# Patient Record
Sex: MALE | Race: BLACK OR AFRICAN AMERICAN | NOT HISPANIC OR LATINO | Employment: PART TIME | URBAN - METROPOLITAN AREA
[De-identification: names, ages, dates, MRNs, and addresses within clinical notes are randomized per-mention and may not be internally consistent; named-entity substitution may affect disease eponyms.]

---

## 2023-02-12 ENCOUNTER — HOSPITAL ENCOUNTER (EMERGENCY)
Facility: HOSPITAL | Age: 43
Discharge: HOME | End: 2023-02-12
Attending: INTERNAL MEDICINE

## 2023-02-12 VITALS
SYSTOLIC BLOOD PRESSURE: 192 MMHG | DIASTOLIC BLOOD PRESSURE: 134 MMHG | RESPIRATION RATE: 23 BRPM | HEART RATE: 98 BPM | WEIGHT: 190 LBS | OXYGEN SATURATION: 98 % | TEMPERATURE: 99 F

## 2023-02-12 DIAGNOSIS — I10 HYPERTENSION, UNCONTROLLED: Primary | ICD-10-CM

## 2023-02-12 DIAGNOSIS — R06.02 SHORTNESS OF BREATH: ICD-10-CM

## 2023-02-12 DIAGNOSIS — R07.9 CHEST PAIN: ICD-10-CM

## 2023-02-12 DIAGNOSIS — J18.9 COMMUNITY ACQUIRED PNEUMONIA, UNSPECIFIED LATERALITY: ICD-10-CM

## 2023-02-12 LAB
ALBUMIN SERPL-MCNC: 3.3 G/DL (ref 3.5–5)
ALBUMIN/GLOB SERPL: 0.6 RATIO (ref 1.1–2)
ALP SERPL-CCNC: 89 UNIT/L (ref 40–150)
ALT SERPL-CCNC: 42 UNIT/L (ref 0–55)
AST SERPL-CCNC: 26 UNIT/L (ref 5–34)
BASOPHILS # BLD AUTO: 0.05 X10(3)/MCL (ref 0–0.2)
BASOPHILS NFR BLD AUTO: 0.5 %
BILIRUBIN DIRECT+TOT PNL SERPL-MCNC: 0.3 MG/DL
BNP BLD-MCNC: 13.7 PG/ML
BUN SERPL-MCNC: 14.1 MG/DL (ref 8.9–20.6)
CALCIUM SERPL-MCNC: 9.9 MG/DL (ref 8.4–10.2)
CHLORIDE SERPL-SCNC: 104 MMOL/L (ref 98–107)
CO2 SERPL-SCNC: 27 MMOL/L (ref 22–29)
CREAT SERPL-MCNC: 1.05 MG/DL (ref 0.73–1.18)
D DIMER PPP IA.FEU-MCNC: 0.65 UG/ML FEU (ref 0–0.5)
EOSINOPHIL # BLD AUTO: 0.25 X10(3)/MCL (ref 0–0.9)
EOSINOPHIL NFR BLD AUTO: 2.5 %
ERYTHROCYTE [DISTWIDTH] IN BLOOD BY AUTOMATED COUNT: 12.2 % (ref 11.5–17)
FLUAV AG UPPER RESP QL IA.RAPID: NOT DETECTED
FLUBV AG UPPER RESP QL IA.RAPID: NOT DETECTED
GFR SERPLBLD CREATININE-BSD FMLA CKD-EPI: >60 MLS/MIN/1.73/M2
GLOBULIN SER-MCNC: 5.5 GM/DL (ref 2.4–3.5)
GLUCOSE SERPL-MCNC: 92 MG/DL (ref 74–100)
HCT VFR BLD AUTO: 45.2 % (ref 42–52)
HGB BLD-MCNC: 14.7 GM/DL (ref 14–18)
IMM GRANULOCYTES # BLD AUTO: 0.02 X10(3)/MCL (ref 0–0.04)
IMM GRANULOCYTES NFR BLD AUTO: 0.2 %
LYMPHOCYTES # BLD AUTO: 1.98 X10(3)/MCL (ref 0.6–4.6)
LYMPHOCYTES NFR BLD AUTO: 19.5 %
MCH RBC QN AUTO: 29.3 PG
MCHC RBC AUTO-ENTMCNC: 32.5 MG/DL (ref 33–36)
MCV RBC AUTO: 90 FL (ref 80–94)
MONOCYTES # BLD AUTO: 1.03 X10(3)/MCL (ref 0.1–1.3)
MONOCYTES NFR BLD AUTO: 10.2 %
NEUTROPHILS # BLD AUTO: 6.8 X10(3)/MCL (ref 2.1–9.2)
NEUTROPHILS NFR BLD AUTO: 67.1 %
NRBC BLD AUTO-RTO: 0 %
PLATELET # BLD AUTO: 474 X10(3)/MCL (ref 130–400)
PMV BLD AUTO: 10.1 FL (ref 7.4–10.4)
POTASSIUM SERPL-SCNC: 3.2 MMOL/L (ref 3.5–5.1)
PROT SERPL-MCNC: 8.8 GM/DL (ref 6.4–8.3)
RBC # BLD AUTO: 5.02 X10(6)/MCL (ref 4.7–6.1)
SARS-COV-2 RNA RESP QL NAA+PROBE: NOT DETECTED
SODIUM SERPL-SCNC: 141 MMOL/L (ref 136–145)
TROPONIN I SERPL-MCNC: <0.01 NG/ML (ref 0–0.04)
WBC # SPEC AUTO: 10.1 X10(3)/MCL (ref 4.5–11.5)

## 2023-02-12 PROCEDURE — 85025 COMPLETE CBC W/AUTO DIFF WBC: CPT | Performed by: INTERNAL MEDICINE

## 2023-02-12 PROCEDURE — 63600175 PHARM REV CODE 636 W HCPCS: Performed by: INTERNAL MEDICINE

## 2023-02-12 PROCEDURE — 99285 EMERGENCY DEPT VISIT HI MDM: CPT | Mod: 25

## 2023-02-12 PROCEDURE — 85379 FIBRIN DEGRADATION QUANT: CPT | Performed by: INTERNAL MEDICINE

## 2023-02-12 PROCEDURE — 96374 THER/PROPH/DIAG INJ IV PUSH: CPT

## 2023-02-12 PROCEDURE — 0240U COVID/FLU A&B PCR: CPT | Performed by: INTERNAL MEDICINE

## 2023-02-12 PROCEDURE — 25000003 PHARM REV CODE 250: Performed by: INTERNAL MEDICINE

## 2023-02-12 PROCEDURE — 84484 ASSAY OF TROPONIN QUANT: CPT | Performed by: INTERNAL MEDICINE

## 2023-02-12 PROCEDURE — 93005 ELECTROCARDIOGRAM TRACING: CPT

## 2023-02-12 PROCEDURE — 83880 ASSAY OF NATRIURETIC PEPTIDE: CPT | Performed by: INTERNAL MEDICINE

## 2023-02-12 PROCEDURE — 96375 TX/PRO/DX INJ NEW DRUG ADDON: CPT

## 2023-02-12 PROCEDURE — 80053 COMPREHEN METABOLIC PANEL: CPT | Performed by: INTERNAL MEDICINE

## 2023-02-12 RX ORDER — LISINOPRIL AND HYDROCHLOROTHIAZIDE 12.5; 2 MG/1; MG/1
1 TABLET ORAL DAILY
Qty: 30 TABLET | Refills: 3 | Status: SHIPPED | OUTPATIENT
Start: 2023-02-12 | End: 2023-02-18

## 2023-02-12 RX ORDER — FUROSEMIDE 10 MG/ML
20 INJECTION INTRAMUSCULAR; INTRAVENOUS
Status: COMPLETED | OUTPATIENT
Start: 2023-02-12 | End: 2023-02-12

## 2023-02-12 RX ORDER — ENALAPRILAT 1.25 MG/ML
0.62 INJECTION INTRAVENOUS
Status: COMPLETED | OUTPATIENT
Start: 2023-02-12 | End: 2023-02-12

## 2023-02-12 RX ORDER — PROMETHAZINE HYDROCHLORIDE AND DEXTROMETHORPHAN HYDROBROMIDE 6.25; 15 MG/5ML; MG/5ML
5 SYRUP ORAL EVERY 4 HOURS PRN
Qty: 180 ML | Refills: 0 | Status: SHIPPED | OUTPATIENT
Start: 2023-02-12 | End: 2023-03-01 | Stop reason: ALTCHOICE

## 2023-02-12 RX ORDER — AZITHROMYCIN 250 MG/1
TABLET, FILM COATED ORAL
Qty: 6 TABLET | Refills: 0 | Status: SHIPPED | OUTPATIENT
Start: 2023-02-12 | End: 2023-03-01 | Stop reason: ALTCHOICE

## 2023-02-12 RX ADMIN — FUROSEMIDE 20 MG: 10 INJECTION INTRAMUSCULAR; INTRAVENOUS at 03:02

## 2023-02-12 RX ADMIN — ENALAPRILAT 0.62 MG: 1.25 INJECTION INTRAVENOUS at 05:02

## 2023-02-12 RX ADMIN — NITROGLYCERIN 1 INCH: 20 OINTMENT TOPICAL at 03:02

## 2023-02-12 NOTE — ED PROVIDER NOTES
Encounter Date: 2/12/2023       History     Chief Complaint   Patient presents with    Cough     Cold cough congestion x2 days, pt is hypertensive, has not been taking BP meds.      Presents with cough for few days, no fever or sick contacts. States was Dx with HTN but not taking medications. Cough is wet with clear sputum. Denies chest pain or leg swelling    The history is provided by the patient.   Review of patient's allergies indicates:  No Known Allergies  No past medical history on file.  No past surgical history on file.  No family history on file.     Review of Systems   Constitutional:  Negative for fever.   HENT:  Negative for sore throat.    Respiratory:  Positive for cough and shortness of breath.    Cardiovascular:  Negative for chest pain.   Gastrointestinal:  Negative for nausea.   Genitourinary:  Negative for dysuria.   Musculoskeletal:  Negative for back pain.   Skin:  Negative for rash.   Neurological:  Negative for weakness.   Hematological:  Does not bruise/bleed easily.   All other systems reviewed and are negative.    Physical Exam     Initial Vitals [02/12/23 1441]   BP Pulse Resp Temp SpO2   (!) 195/145 86 18 98.8 °F (37.1 °C) 100 %      MAP       --         Physical Exam    Nursing note and vitals reviewed.  Constitutional: He appears well-developed and well-nourished. No distress.   HENT:   Head: Normocephalic and atraumatic.   Mouth/Throat: Oropharynx is clear and moist.   Eyes: Conjunctivae are normal. Pupils are equal, round, and reactive to light.   Neck: Neck supple. No tracheal deviation present. No JVD present.   Normal range of motion.  Cardiovascular:  Regular rhythm, normal heart sounds and intact distal pulses.           Pulmonary/Chest: No respiratory distress. He has rales (Bibasilar).   Abdominal: Abdomen is soft. Bowel sounds are normal. He exhibits no distension. There is no abdominal tenderness. There is no rebound and no guarding.   Musculoskeletal:         General: No  edema. Normal range of motion.      Cervical back: Normal range of motion and neck supple.     Neurological: He is alert and oriented to person, place, and time. He has normal strength. GCS score is 15. GCS eye subscore is 4. GCS verbal subscore is 5. GCS motor subscore is 6.   Skin: Skin is warm and dry. No rash noted.   Psychiatric: His behavior is normal.       ED Course   Procedures  Labs Reviewed   COMPREHENSIVE METABOLIC PANEL - Abnormal; Notable for the following components:       Result Value    Potassium Level 3.2 (*)     Protein Total 8.8 (*)     Albumin Level 3.3 (*)     Globulin 5.5 (*)     Albumin/Globulin Ratio 0.6 (*)     All other components within normal limits   CBC WITH DIFFERENTIAL - Abnormal; Notable for the following components:    MCHC 32.5 (*)     Platelet 474 (*)     All other components within normal limits   D DIMER, QUANTITATIVE - Abnormal; Notable for the following components:    D-Dimer 0.65 (*)     All other components within normal limits   B-TYPE NATRIURETIC PEPTIDE - Normal   TROPONIN I - Normal   COVID/FLU A&B PCR - Normal    Narrative:     The Xpert Xpress SARS-CoV-2/FLU/RSV plus is a rapid, multiplexed real-time PCR test intended for the simultaneous qualitative detection and differentiation of SARS-CoV-2, Influenza A, Influenza B, and respiratory syncytial virus (RSV) viral RNA in either nasopharyngeal swab or nasal swab specimens.         CBC W/ AUTO DIFFERENTIAL    Narrative:     The following orders were created for panel order CBC auto differential.  Procedure                               Abnormality         Status                     ---------                               -----------         ------                     CBC with Differential[[id number]]        Abnormal            Final result                 Please view results for these tests on the individual orders.   EXTRA TUBES    Narrative:     The following orders were created for panel order EXTRA TUBES.  Procedure                                Abnormality         Status                     ---------                               -----------         ------                     Gold Top Hold[[id number]]                                    In process                   Please view results for these tests on the individual orders.   GOLD TOP HOLD     EKG Readings: (Independently Interpreted)   Initial Reading: No STEMI. Rhythm: Normal Sinus Rhythm. Heart Rate: 90. Ectopy: No Ectopy. Conduction: Normal. ST Segments: Normal ST Segments. T Waves Flipped: III and AVF. Clinical Impression: Normal Sinus Rhythm Other Impression: LVH   ECG Results              EKG 12-lead (In process)  Result time 02/12/23 15:59:05      In process by Interface, Lab In [facility] (02/12/23 15:59:05)                   Narrative:    Test Reason : R07.9,    Vent. Rate : 089 BPM     Atrial Rate : 089 BPM     P-R Int : 142 ms          QRS Dur : 088 ms      QT Int : 380 ms       P-R-T Axes : 051 023 -22 degrees     QTc Int : 462 ms    Normal sinus rhythm  Voltage criteria for left ventricular hypertrophy  T wave abnormality, consider inferior ischemia  Prolonged QT  Abnormal ECG  No previous ECGs available    Referred By: [email or web address]   SELF           Confirmed By:                                   Imaging Results              X-Ray Chest PA And Lateral (Final result)  Result time 02/12/23 15:21:31      Final result by Holly Herron MD (02/12/23 15:21:31)                   Impression:      Multifocal interstitial opacities within the lungs suspicious for atypical infection.  Recommend follow up PA and lateral radiographs in 4-6 weeks after completion of appropriate therapy to ensure resolution and exclude persistent opacity, nodule or mass.      Electronically signed by: Holly Herron  Date:    02/12/2023  Time:    15:21               Narrative:    EXAMINATION:  XR CHEST PA AND LATERAL    CLINICAL HISTORY:  Shortness of breath    TECHNIQUE:  Two views of the  chest    COMPARISON:  04/11/2016    FINDINGS:  LINES AND TUBES: EKG/telemetry leads overlie the chest.    MEDIASTINUM AND DEANA: The cardiac silhouette is normal.    LUNGS: There are patchy interstitial opacities within the lungs at both lung apices and the left lung base.    PLEURA:No pleural effusion. No pneumothorax.    BONES: No acute osseous abnormality.                                       Medications   nitroGLYCERIN 2% TD oint ointment 1 inch (1 inch Topical (Top) Given 2/12/23 1502)   furosemide injection 20 mg (20 mg Intravenous Given 2/12/23 1502)   enalaprilat injection 0.625 mg (0.625 mg Intravenous Given 2/12/23 1710)     Medical Decision Making:   Differential Diagnosis:   Pneumonia, Asthma exacerbation, COPD exacerbation, Pericardial Effusion, Hear Failure, Pulmonary Emboli, Pleural effusion, malignancy, among others   Independently Interpreted Test(s):   I have ordered and independently interpreted X-rays - see prior notes.  I have ordered and independently interpreted EKG Reading(s) - see prior notes  Clinical Tests:   Lab Tests: Ordered  Radiological Study: Ordered  Medical Tests: Ordered                        Clinical Impression:   Final diagnoses:  [R07.9] Chest pain  [R06.02] Shortness of breath  [I10] Hypertension, uncontrolled (Primary)  [J18.9] Community acquired pneumonia, unspecified laterality        ED Disposition Condition    Discharge Stable          ED Prescriptions       Medication Sig Dispense Start Date End Date Auth. Provider    lisinopriL-hydrochlorothiazide (PRINZIDE,ZESTORETIC) 20-12.5 mg per tablet Take 1 tablet by mouth once daily. 30 tablet 2/12/2023 2/12/2024 Wai Murrell MD    azithromycin (Z-JAMES) 250 MG tablet Take 2 tablets by mouth on day 1; Take 1 tablet by mouth on days 2-5 6 tablet 2/12/2023 2/17/2023 Wai Murrell MD    promethazine-dextromethorphan (PROMETHAZINE-DM) 6.25-15 mg/5 mL Syrp Take 5 mLs by mouth every 4 (four) hours as  needed. 180 mL 2/12/2023 2/22/2023 Wai Murrell MD          Follow-up Information       Follow up With Specialties Details Why Contact Info    [facility] Emergency Medicine  If symptoms worsen [facility] [facility] [address] [phone]    [facility]  Schedule an appointment as soon as possible for a visit in 2 months  [address] [facility] [address]             Wai Murrell MD  02/12/23 0057

## 2023-02-18 ENCOUNTER — HOSPITAL ENCOUNTER (EMERGENCY)
Facility: HOSPITAL | Age: 43
Discharge: HOME | End: 2023-02-18
Attending: EMERGENCY MEDICINE

## 2023-02-18 VITALS
OXYGEN SATURATION: 99 % | BODY MASS INDEX: 29.42 KG/M2 | HEIGHT: 68 IN | WEIGHT: 194.13 LBS | RESPIRATION RATE: 20 BRPM | DIASTOLIC BLOOD PRESSURE: 88 MMHG | HEART RATE: 109 BPM | SYSTOLIC BLOOD PRESSURE: 141 MMHG | TEMPERATURE: 98 F

## 2023-02-18 DIAGNOSIS — T78.3XXA ANGIOEDEMA, INITIAL ENCOUNTER: Primary | ICD-10-CM

## 2023-02-18 PROCEDURE — 63600175 PHARM REV CODE 636 W HCPCS: Performed by: EMERGENCY MEDICINE

## 2023-02-18 PROCEDURE — 96375 TX/PRO/DX INJ NEW DRUG ADDON: CPT

## 2023-02-18 PROCEDURE — 99285 EMERGENCY DEPT VISIT HI MDM: CPT | Mod: 25

## 2023-02-18 PROCEDURE — 25000003 PHARM REV CODE 250: Performed by: EMERGENCY MEDICINE

## 2023-02-18 PROCEDURE — 96372 THER/PROPH/DIAG INJ SC/IM: CPT | Performed by: EMERGENCY MEDICINE

## 2023-02-18 PROCEDURE — 96374 THER/PROPH/DIAG INJ IV PUSH: CPT

## 2023-02-18 RX ORDER — FAMOTIDINE 10 MG/ML
20 INJECTION INTRAVENOUS
Status: COMPLETED | OUTPATIENT
Start: 2023-02-18 | End: 2023-02-18

## 2023-02-18 RX ORDER — DIPHENHYDRAMINE HYDROCHLORIDE 50 MG/ML
50 INJECTION INTRAMUSCULAR; INTRAVENOUS
Status: COMPLETED | OUTPATIENT
Start: 2023-02-18 | End: 2023-02-18

## 2023-02-18 RX ORDER — EPINEPHRINE CONVENIENCE KIT 1 MG/ML(1)
0.1 KIT INTRAMUSCULAR; SUBCUTANEOUS
Status: COMPLETED | OUTPATIENT
Start: 2023-02-18 | End: 2023-02-18

## 2023-02-18 RX ORDER — HYDROCHLOROTHIAZIDE 25 MG/1
25 TABLET ORAL DAILY
Qty: 30 TABLET | Refills: 11 | Status: SHIPPED | OUTPATIENT
Start: 2023-02-18 | End: 2023-03-01 | Stop reason: ALTCHOICE

## 2023-02-18 RX ORDER — METHYLPREDNISOLONE SOD SUCC 125 MG
125 VIAL (EA) INJECTION
Status: COMPLETED | OUTPATIENT
Start: 2023-02-18 | End: 2023-02-18

## 2023-02-18 RX ORDER — DIPHENHYDRAMINE HCL 25 MG
25 CAPSULE ORAL EVERY 6 HOURS PRN
Qty: 20 CAPSULE | Refills: 0 | Status: SHIPPED | OUTPATIENT
Start: 2023-02-18 | End: 2023-03-01 | Stop reason: ALTCHOICE

## 2023-02-18 RX ORDER — PREDNISONE 50 MG/1
50 TABLET ORAL DAILY
Qty: 5 TABLET | Refills: 0 | Status: SHIPPED | OUTPATIENT
Start: 2023-02-18 | End: 2023-03-01 | Stop reason: ALTCHOICE

## 2023-02-18 RX ADMIN — FAMOTIDINE 20 MG: 10 INJECTION INTRAVENOUS at 05:02

## 2023-02-18 RX ADMIN — DIPHENHYDRAMINE HYDROCHLORIDE 50 MG: 50 INJECTION, SOLUTION INTRAMUSCULAR; INTRAVENOUS at 05:02

## 2023-02-18 RX ADMIN — EPINEPHRINE 0.1 MG: 1 INJECTION INTRAMUSCULAR; INTRAVENOUS; SUBCUTANEOUS at 05:02

## 2023-02-18 RX ADMIN — METHYLPREDNISOLONE SODIUM SUCCINATE 125 MG: 125 INJECTION, POWDER, FOR SOLUTION INTRAMUSCULAR; INTRAVENOUS at 05:02

## 2023-02-19 NOTE — ED NOTES
Pt iv discontinued. Pt given discharge instructions. Pt instructed to follow up with pcp. Pt instructed to return to er if complications occur or symptoms worsen or is trouble swallowing, trouble breathing, increased swelling, tingling of tongue of lip to return to er immediately. Pt stated understanding.

## 2023-03-01 ENCOUNTER — OFFICE VISIT (OUTPATIENT)
Dept: INTERNAL MEDICINE | Facility: CLINIC | Age: 43
End: 2023-03-01

## 2023-03-01 VITALS
WEIGHT: 188.63 LBS | HEART RATE: 80 BPM | DIASTOLIC BLOOD PRESSURE: 100 MMHG | HEIGHT: 68 IN | BODY MASS INDEX: 28.59 KG/M2 | SYSTOLIC BLOOD PRESSURE: 150 MMHG | RESPIRATION RATE: 18 BRPM | TEMPERATURE: 98 F

## 2023-03-01 DIAGNOSIS — I10 PRIMARY HYPERTENSION: Chronic | ICD-10-CM

## 2023-03-01 DIAGNOSIS — Z13.1 SCREENING FOR DIABETES MELLITUS: Primary | ICD-10-CM

## 2023-03-01 DIAGNOSIS — I10 HYPERTENSION, UNCONTROLLED: ICD-10-CM

## 2023-03-01 DIAGNOSIS — Z91.199 NONCOMPLIANCE: ICD-10-CM

## 2023-03-01 DIAGNOSIS — J18.9 COMMUNITY ACQUIRED PNEUMONIA, UNSPECIFIED LATERALITY: ICD-10-CM

## 2023-03-01 DIAGNOSIS — Z76.89 ENCOUNTER TO ESTABLISH CARE: ICD-10-CM

## 2023-03-01 DIAGNOSIS — E87.6 HYPOKALEMIA: ICD-10-CM

## 2023-03-01 DIAGNOSIS — Z12.5 SCREENING FOR MALIGNANT NEOPLASM OF PROSTATE: ICD-10-CM

## 2023-03-01 PROBLEM — Z85.528 H/O RENAL CELL CARCINOMA: Chronic | Status: ACTIVE | Noted: 2023-03-01

## 2023-03-01 PROBLEM — Z85.528 H/O RENAL CELL CARCINOMA: Status: ACTIVE | Noted: 2023-03-01

## 2023-03-01 PROCEDURE — 99215 OFFICE O/P EST HI 40 MIN: CPT | Mod: PBBFAC | Performed by: NURSE PRACTITIONER

## 2023-03-01 PROCEDURE — 99204 OFFICE O/P NEW MOD 45 MIN: CPT | Mod: S$PBB,,, | Performed by: NURSE PRACTITIONER

## 2023-03-01 PROCEDURE — 99204 PR OFFICE/OUTPT VISIT, NEW, LEVL IV, 45-59 MIN: ICD-10-PCS | Mod: S$PBB,,, | Performed by: NURSE PRACTITIONER

## 2023-03-01 RX ORDER — NIFEDIPINE 60 MG/1
60 TABLET, EXTENDED RELEASE ORAL NIGHTLY
Qty: 30 TABLET | Refills: 1 | Status: SHIPPED | OUTPATIENT
Start: 2023-03-01 | End: 2023-03-21 | Stop reason: SDUPTHER

## 2023-03-01 NOTE — PROGRESS NOTES
Lalitha Garces, NICOLASA   [facility]  [address] [facility] [address]      PATIENT NAME: Alberto Ward  : 1980  DATE: 3/1/23  MRN: [id number]      Reason for Visit / Chief Complaint: Establish Care       History of Present Illness / Problem Focused Workflow     Alberto Ward is a 42 y.o. Black or  male presents to the clinic to establish PCP in [facility]. PMH HTN, St 1 renal cell carcinoma s/p partial right nephrectomy (), polysubstance abuse (amphetamines and marijuana). Pt reported to ED on 23 with c/o cough and congestion; was hypertensive at that time. Pt was dx'd with CAP and discharged with prinizide and Z-Henrique. Pt returned to ED on 23 with swelling to upper lip. Was dx'd with angioedema and prinizide was discontinued and HCTZ was prescribed.    Today, pt reports med compliance. Father present during visit. States swelling to lips resolved with benadryl and prednisone. Reports SOB and cough resolved with completion of zpak. Bp remains elevated today; pt remains asymptomatic. 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 Declines vaccines today d/t costs. Denies chest pain, shortness of breath, cough, fever, headache, dizziness, weakness, abdominal pain, nausea, vomiting, diarrhea, constipation, dysuria, depression, anxiety, SI/HI.      Review of Systems     Review of Systems     See HPI for details    Constitutional: Denies Change in appetite. Denies Chills. Denies Fever. Denies Night sweats.  Eye: Denies Blurred vision. Denies Discharge. Denies Eye pain.  ENT: Denies Decreased hearing. Denies Sore throat. Denies Swollen glands.  Respiratory: Denies Cough. Denies Shortness of breath. Denies Shortness of breath with exertion. Denies Wheezing.  Cardiovascular: DeniesChest pain at rest. Denies Chest pain with exertion. Denies Irregular heartbeat. Denies Palpitations. Denies  Edema.  Gastrointestinal: Denies Abdominal pain. Denies Diarrhea. Denies Nausea. Denies Vomiting. Denies Hematemesis or Hematochezia.  Genitourinary: Denies Dysuria. Denies Urinary frequency. Denies Urinary urgency. Denies Blood in urine.  Endocrine: Denies Cold intolerance. Denies Excessive thirst. Denies Heat intolerance. Denies Weight loss. Denies Weight gain.  Musculoskeletal: Denies Painful joints. Denies Weakness.  Integumentary: Denies Rash. Denies Itching. Denies Dry skin.  Neurologic: Denies Dizziness. Denies Fainting. Denies Headache.  Psychiatric: Denies Depression. Denies Anxiety. Denies Suicidal/Homicidal ideations.    All Other ROS: Negative except as stated in HPI.     Medical / Surgical / Social / Family History       ----------------------------  Hypertension  Renal cell carcinoma, right     Past Surgical History:   Procedure Laterality Date    history of partial right nephrectomy  Right 2013    KIDNEY SURGERY Right        Social History     Socioeconomic History    Marital status: Single   Tobacco Use    Smoking status: Never    Smokeless tobacco: Never   Substance and Sexual Activity    Alcohol use: Yes     Comment: beer on occ    Drug use: Yes     Types: Marijuana     Comment: 2 weeks a month daily     Social Determinants of Health     Transportation Needs: No Transportation Needs    Lack of Transportation (Medical): No    Lack of Transportation (Non-Medical): No   Physical Activity: Inactive    Days of Exercise per Week: 0 days    Minutes of Exercise per Session: 0 min   Housing Stability: Low Risk     Unable to Pay for Housing in the Last Year: No    Number of Places Lived in the Last Year: 1    Unstable Housing in the Last Year: No        History reviewed. No pertinent family history.     Medications and Allergies     Medications  Current Outpatient Medications   Medication Instructions    NIFEdipine (PROCARDIA-XL) 60 mg, Oral, Nightly         Allergies  Review of patient's allergies  "indicates:   Allergen Reactions    Lisinopril (bulk) Other (See Comments)     Angioedema       Physical Examination     BP (!) 150/100 (BP Location: Right arm, Patient Position: Sitting, BP Method: Large (Manual))   Pulse 80   Temp 98.4 °F (36.9 °C) (Oral)   Resp 18   Ht 5' 7.99" (1.727 m)   Wt 85.5 kg (188 lb 9.6 oz)   BMI 28.68 kg/m²     Physical Exam    General: Alert and oriented, No acute distress.  Head: Normocephalic, Atraumatic.  Eye: Pupils are equal, round and reactive to light, Extraocular movements are intact, Sclera non-icteric.  Ears/Nose/Throat: Normal, Mucosa moist,Clear.  Neck/Thyroid: Supple, Non-tender, No carotid bruit, No lymphadenopathy, No JVD, Full range of motion.  Respiratory: Clear to auscultation bilaterally; No wheezes, rales or rhonchi,Non-labored respirations, Symmetrical chest wall expansion.  Cardiovascular: Regular rate and rhythm, S1/S2 normal, No murmurs, rubs or gallops.  Gastrointestinal: Soft, Non-tender, Non-distended, Normal bowel sounds, No palpable organomegaly.  Musculoskeletal: Normal range of motion.  Integumentary: Warm, Dry, Intact, No suspicious lesions or rashes.  Extremities: No clubbing, cyanosis or edema  Neurologic: No focal deficits, Cranial Nerves II-XII are grossly intact, Motor strength normal upper and lower extremities, Sensory exam intact.  Psychiatric: Normal interaction, Coherent speech, Euthymic mood, Appropriate affect       Results     Lab Results   Component Value Date    WBC 10.1 02/12/2023    HGB 14.7 02/12/2023    HCT 45.2 02/12/2023     (H) 02/12/2023    ALT 42 02/12/2023    AST 26 02/12/2023     02/12/2023    K 3.2 (L) 02/12/2023    CREATININE 1.05 02/12/2023    BUN 14.1 02/12/2023    CO2 27 02/12/2023     Narrative & Impression  EXAMINATION:  XR CHEST PA AND LATERAL     CLINICAL HISTORY:  Shortness of breath     TECHNIQUE:  Two views of the chest     COMPARISON:  04/11/2016     FINDINGS:  LINES AND TUBES: EKG/telemetry leads " overlie the chest.     MEDIASTINUM AND DEANA: The cardiac silhouette is normal.     LUNGS: There are patchy interstitial opacities within the lungs at both lung apices and the left lung base.     PLEURA:No pleural effusion. No pneumothorax.     BONES: No acute osseous abnormality.     Impression:     Multifocal interstitial opacities within the lungs suspicious for atypical infection.  Recommend follow up PA and lateral radiographs in 4-6 weeks after completion of appropriate therapy to ensure resolution and exclude persistent opacity, nodule or mass.        Electronically signed by: Holly Herron  Date:                                            02/12/2023  Time:                                           15:21    Assessment and Plan (including Health Maintenance)     Plan:     1. Encounter to establish care  Labs ordered.    2. Hypertension, uncontrolled  Elevated.  D/C HCTZ.  Rx procardia 60 mg daily.  Follow a low sodium (less than 2 grams of sodium per day), DASH diet.   Continue medications as prescribed.  Monitor blood pressure and report any consistent values greater than 140/90 and keep a log.  Maintain healthy weight with a BMI goal of <30.   Aerobic exercise for 150 minutes per week (or 5 days a week for 30 minutes each day).    - Ambulatory referral/consult to Internal Medicine  - Lipid Panel; Future  - Microalbumin/Creatinine Ratio, Urine; Future  - TSH; Future  - Urinalysis, Reflex to Urine Culture; Future    3. Community acquired pneumonia, unspecified laterality  Completed zpak as prescribed.  Repeat CXR ordered.  - Ambulatory referral/consult to Internal Medicine  - X-Ray Chest PA And Lateral; Future    4. Hypokalemia  K 3.2 on 2/13/23.  Denies S/S of hypokalemia.  Repeat CMP ordered.  - Comprehensive Metabolic Panel; Future    5. Noncompliance  At length discussion with pt regarding continued medical/medicine noncompliance, i.e. stroke, MI, DKA, loss of sight, limb, renal failure and possible  death.  Discussed importance of controlling HTN with hx of partial nephrectomy and renal cell carcinoma.  Understanding voiced.      Problem List Items Addressed This Visit          Pulmonary    CAP (community acquired pneumonia)    Relevant Orders    X-Ray Chest PA And Lateral       Cardiac/Vascular    Primary hypertension (Chronic)       Renal/    Hypokalemia    Relevant Orders    Comprehensive Metabolic Panel       Palliative Care    Noncompliance       Other    Encounter to establish care     Other Visit Diagnoses       Screening for diabetes mellitus    -  Primary    Relevant Orders    Hemoglobin A1C    Hypertension, uncontrolled        Relevant Orders    Lipid Panel    Microalbumin/Creatinine Ratio, Urine    TSH    Urinalysis, Reflex to Urine Culture    Screening for malignant neoplasm of prostate        Relevant Orders    PSA, Screening              Health Maintenance Due   Topic Date Due    Hepatitis C Screening  Never done    Lipid Panel  Never done    COVID-19 Vaccine (1) Never done    Pneumococcal Vaccines (Age 0-64) (1 - PCV) Never done    HIV Screening  Never done    TETANUS VACCINE  Never done    Hemoglobin A1c (Diabetic Prevention Screening)  Never done    Influenza Vaccine (1) Never done       Follow up in about 2 weeks (around 3/15/2023) for Wellness and bp check. Labs and xr prior to appt..        Signature:  NICOLASA Johnson  [facility]  [address] [facility] [address]

## 2023-03-03 ENCOUNTER — HOSPITAL ENCOUNTER (OUTPATIENT)
Dept: RADIOLOGY | Facility: HOSPITAL | Age: 43
Discharge: HOME | End: 2023-03-03
Attending: NURSE PRACTITIONER

## 2023-03-03 DIAGNOSIS — J18.9 COMMUNITY ACQUIRED PNEUMONIA, UNSPECIFIED LATERALITY: ICD-10-CM

## 2023-03-03 PROCEDURE — 71046 X-RAY EXAM CHEST 2 VIEWS: CPT | Mod: TC

## 2023-03-21 ENCOUNTER — OFFICE VISIT (OUTPATIENT)
Dept: INTERNAL MEDICINE | Facility: CLINIC | Age: 43
End: 2023-03-21

## 2023-03-21 ENCOUNTER — HOSPITAL ENCOUNTER (OUTPATIENT)
Dept: RADIOLOGY | Facility: HOSPITAL | Age: 43
Discharge: HOME | End: 2023-03-21
Attending: NURSE PRACTITIONER

## 2023-03-21 VITALS
HEIGHT: 68 IN | DIASTOLIC BLOOD PRESSURE: 82 MMHG | BODY MASS INDEX: 28.63 KG/M2 | SYSTOLIC BLOOD PRESSURE: 138 MMHG | RESPIRATION RATE: 18 BRPM | HEART RATE: 83 BPM | WEIGHT: 188.94 LBS | TEMPERATURE: 98 F

## 2023-03-21 DIAGNOSIS — R73.03 PREDIABETES: Chronic | ICD-10-CM

## 2023-03-21 DIAGNOSIS — I10 PRIMARY HYPERTENSION: Chronic | ICD-10-CM

## 2023-03-21 DIAGNOSIS — Z00.00 WELLNESS EXAMINATION: Primary | ICD-10-CM

## 2023-03-21 DIAGNOSIS — M54.2 BILATERAL NECK PAIN: ICD-10-CM

## 2023-03-21 DIAGNOSIS — E66.3 OVERWEIGHT WITH BODY MASS INDEX (BMI) OF 28 TO 28.9 IN ADULT: Chronic | ICD-10-CM

## 2023-03-21 PROCEDURE — 99212 OFFICE O/P EST SF 10 MIN: CPT | Mod: 25,S$PBB,, | Performed by: NURSE PRACTITIONER

## 2023-03-21 PROCEDURE — 99396 PR PREVENTIVE VISIT,EST,40-64: ICD-10-PCS | Mod: S$PBB,,, | Performed by: NURSE PRACTITIONER

## 2023-03-21 PROCEDURE — 99396 PREV VISIT EST AGE 40-64: CPT | Mod: S$PBB,,, | Performed by: NURSE PRACTITIONER

## 2023-03-21 PROCEDURE — 99214 OFFICE O/P EST MOD 30 MIN: CPT | Mod: PBBFAC | Performed by: NURSE PRACTITIONER

## 2023-03-21 PROCEDURE — 72040 X-RAY EXAM NECK SPINE 2-3 VW: CPT | Mod: TC

## 2023-03-21 PROCEDURE — 99212 PR OFFICE/OUTPT VISIT, EST, LEVL II, 10-19 MIN: ICD-10-PCS | Mod: 25,S$PBB,, | Performed by: NURSE PRACTITIONER

## 2023-03-21 RX ORDER — NIFEDIPINE 60 MG/1
60 TABLET, EXTENDED RELEASE ORAL NIGHTLY
Qty: 90 TABLET | Refills: 1 | Status: SHIPPED | OUTPATIENT
Start: 2023-03-21

## 2023-03-21 RX ORDER — TIZANIDINE 4 MG/1
4 TABLET ORAL EVERY 8 HOURS PRN
Qty: 30 TABLET | Refills: 1 | Status: SHIPPED | OUTPATIENT
Start: 2023-03-21

## 2023-03-21 NOTE — PROGRESS NOTES
Lalitha Garces, NICOLASA   [facility]  [address] [facility] [address]      PATIENT NAME: Alberto Ward  : 1980  DATE: 3/21/23  MRN: [id number]      Reason for Visit / Chief Complaint: Hypertension (Wellness, refused vaccines, X-ray and lab results 3/3/23, c/o neck pain off/on )       History of Present Illness / Problem Focused Workflow     Alberto Ward is a 42 y.o. Black or  male presents to the clinic for annual exam. PMH HTN, St 1 renal cell carcinoma s/p partial right nephrectomy (), polysubstance abuse (amphetamines and marijuana).     Today, pt reports med compliance. Bp at goal today. Denies any lingering cough or SOB. Father present again today during visit. Pt reports intermittent neck pain, which denies today. Labs reviewed with pt. Declines vaccines again today. Denies chest pain, shortness of breath, cough, fever, headache, dizziness, weakness, abdominal pain, nausea, vomiting, diarrhea, constipation, dysuria, depression, anxiety, SI/HI.    Review of Systems     Review of Systems     See HPI for details    Constitutional: Denies Change in appetite. Denies Chills. Denies Fever. Denies Night sweats.  Eye: Denies Blurred vision. Denies Discharge. Denies Eye pain.  ENT: Denies Decreased hearing. Denies Sore throat. Denies Swollen glands.  Respiratory: Denies Cough. Denies Shortness of breath. Denies Shortness of breath with exertion. Denies Wheezing.  Cardiovascular: Denies Chest pain at rest. Denies Chest pain with exertion. Denies Irregular heartbeat. Denies Palpitations. Denies Edema.  Gastrointestinal: Denies Abdominal pain. Denies Diarrhea. Denies Nausea. Denies Vomiting. Denies Hematemesis or Hematochezia.  Genitourinary: Denies Dysuria. Denies Urinary frequency. Denies Urinary urgency. Denies Blood in urine.  Endocrine: Denies Cold intolerance. Denies Excessive thirst. Denies Heat intolerance. Denies  Weight loss. Denies Weight gain.  Musculoskeletal: Denies Painful joints. Denies Weakness.  Integumentary: Denies Rash. Denies Itching. Denies Dry skin.  Neurologic: Denies Dizziness. Denies Fainting. Denies Headache.  Psychiatric: Denies Depression. Denies Anxiety. Denies Suicidal/Homicidal ideations.    All Other ROS: Negative except as stated in HPI.     Medical / Surgical / Social / Family History       ----------------------------  Hypertension  Renal cell carcinoma, right     Past Surgical History:   Procedure Laterality Date    history of partial right nephrectomy  Right 2013    KIDNEY SURGERY Right        Social History     Socioeconomic History    Marital status: Single   Tobacco Use    Smoking status: Never    Smokeless tobacco: Never   Substance and Sexual Activity    Alcohol use: Yes     Comment: beer on occ    Drug use: Not Currently     Types: Marijuana     Comment: 2 weeks a month daily     Social Determinants of Health     Transportation Needs: No Transportation Needs    Lack of Transportation (Medical): No    Lack of Transportation (Non-Medical): No   Physical Activity: Inactive    Days of Exercise per Week: 0 days    Minutes of Exercise per Session: 0 min   Housing Stability: Low Risk     Unable to Pay for Housing in the Last Year: No    Number of Places Lived in the Last Year: 1    Unstable Housing in the Last Year: No        History reviewed. No pertinent family history.     Medications and Allergies     Medications  Current Outpatient Medications   Medication Instructions    NIFEdipine (PROCARDIA-XL) 60 mg, Oral, Nightly    tiZANidine (ZANAFLEX) 4 mg, Oral, Every 8 hours PRN         Allergies  Review of patient's allergies indicates:   Allergen Reactions    Lisinopril (bulk) Other (See Comments)     Angioedema       Physical Examination     /82 (BP Location: Right arm, Patient Position: Sitting, BP Method: Large (Manual))   Pulse 83   Temp 98.4 °F (36.9 °C) (Oral)    "Resp 18   Ht 5' 7.99" (1.727 m)   Wt 85.7 kg (188 lb 15 oz)   BMI 28.73 kg/m²     Physical Exam  Neck:     Musculoskeletal:      Cervical back: Full passive range of motion without pain and normal range of motion. Muscular tenderness present.       General: Alert and oriented, No acute distress.  Head: Normocephalic, Atraumatic.  Eye: Pupils are equal, round and reactive to light, Extraocular movements are intact, Sclera non-icteric.  Ears/Nose/Throat: Normal, Mucosa moist,Clear.  Neck/Thyroid: Supple, Non-tender, No carotid bruit, No lymphadenopathy, No JVD, Full range of motion.  Respiratory: Clear to auscultation bilaterally; No wheezes, rales or rhonchi,Non-labored respirations, Symmetrical chest wall expansion.  Cardiovascular: Regular rate and rhythm, S1/S2 normal, No murmurs, rubs or gallops.  Gastrointestinal: Soft, Non-tender, Non-distended, Normal bowel sounds, No palpable organomegaly.  Musculoskeletal: Normal range of motion.  Integumentary: Warm, Dry, Intact, No suspicious lesions or rashes.  Extremities: No clubbing, cyanosis or edema  Neurologic: No focal deficits, Cranial Nerves II-XII are grossly intact, Motor strength normal upper and lower extremities, Sensory exam intact.  Psychiatric: Normal interaction, Coherent speech, Euthymic mood, Appropriate affect       Results     Lab Results   Component Value Date    WBC 10.1 02/12/2023    HGB 14.7 02/12/2023    HCT 45.2 02/12/2023     (H) 02/12/2023    CHOL 181 03/03/2023    TRIG 72 03/03/2023    ALT 48 03/03/2023    AST 30 03/03/2023     03/03/2023    K 3.7 03/03/2023    CREATININE 0.87 03/03/2023    BUN 10.4 03/03/2023    CO2 27 03/03/2023    TSH 0.484 03/03/2023    PSA 1.91 03/03/2023    HGBA1C 5.7 03/03/2023         Assessment and Plan (including Health Maintenance)     Plan:     1. Wellness examination  Prostate Cancer Screening - PSA 1.9 on 3/3/23.  Colon Cancer Screening - Begin screening at age 40.  Vaccinations: Flu - / Covid - " / Pneumonia - / Shingles - / Tetanus - declines all  Labwork - UTD    2. Prediabetes  A1C 5.7 at goal.  Avoid soda, simple sweets, and limit rice/pasta/bread/starches and consume brown options when possible.   Maintain healthy weight with BMI goal <30.   Perform aerobic exercise for 150 minutes per week (or 5 days a week for 30 minutes each day).   Examine feet daily.     - Comprehensive Metabolic Panel; Future  - Hemoglobin A1C; Future    3. Primary hypertension  At goal.  Refilled amlodipine.  Follow a low sodium (less than 2 grams of sodium per day), DASH diet.   Continue medications as prescribed.  Monitor blood pressure and report any consistent values greater than 140/90 and keep a log.  Maintain healthy weight with a BMI goal of <30.   Aerobic exercise for 150 minutes per week (or 5 days a week for 30 minutes each day).      4. Overweight with body mass index (BMI) of 28 to 28.9 in adult  BMI 28. Goal BMI <25.  Aerobic exercise 150 minutes per week.  Avoid soda, simple sugars, sweets, excessive rice, pasta, potatoes or bread.   Choose brown options when available and portion control.  Limit fast foods and fried foods.   Choose complex carbs in moderation (ex: green, leafy vegetables, beans, oatmeal).  Eat plenty of fresh fruits and vegetables with lean meats daily.   Consider permanent healthy lifestyle changes.    5. Bilateral neck pain  Cervical XR today; will notify of results.   Rx tizanidine prn.  Take tylenol prn - caution NSAID use d/t hx renal cell carcinoma.  Perform neck stretches daily.   Avoid activities than increase neck pain or stiffness.   Apply heating pad, ice pack, and BioFreeze as needed; alternate every 15-20 minutes.   Massage neck to loosen neck muscles.   Continue tylenol arthritis or muscle relaxer as needed.      Problem List Items Addressed This Visit          Cardiac/Vascular    Primary hypertension (Chronic)       Endocrine    Overweight with body mass index (BMI) of 28 to 28.9 in  adult (Chronic)    Prediabetes (Chronic)    Relevant Orders    Comprehensive Metabolic Panel    Hemoglobin A1C       Orthopedic    Bilateral neck pain    Relevant Orders    X-Ray Cervical Spine 2 or 3 Views       Other    Wellness examination - Primary         Health Maintenance Due   Topic Date Due    Hepatitis C Screening  Never done    COVID-19 Vaccine (1) Never done    Pneumococcal Vaccines (Age 0-64) (1 - PCV) Never done    HIV Screening  Never done    TETANUS VACCINE  Never done    Influenza Vaccine (1) Never done       Follow up in about 6 months (around 9/21/2023) for Follow up. Labs one week prior to appt. .        Signature:  NICOLASA Johnson  [facility]  [address] [facility] [address]

## 2023-03-23 NOTE — PROGRESS NOTES
Inform the pt that his neck xr shows arthritis. He can continue tizanidine prn.  Take tylenol prn - caution NSAID use d/t hx renal cell carcinoma.  Perform neck stretches daily.   Avoid activities than increase neck pain or stiffness.   Apply heating pad, ice pack, and BioFreeze as needed; alternate every 15-20 minutes.   Massage neck to loosen neck muscles.   Continue tylenol arthritis or muscle relaxer as needed.

## 2023-03-28 NOTE — PROGRESS NOTES
Attempted to contact patient at both numbers listed, left vm on mother's phone to return call to clinic.

## 2023-04-21 ENCOUNTER — TELEPHONE (OUTPATIENT)
Dept: INTERNAL MEDICINE | Facility: CLINIC | Age: 43
End: 2023-04-21

## 2023-04-21 NOTE — TELEPHONE ENCOUNTER
----- Message from NICOLASA Okeefe sent at 3/23/2023  4:11 PM CDT -----  Inform the pt that his neck xr shows arthritis. He can continue tizanidine prn.  Take tylenol prn - caution NSAID use d/t hx renal cell carcinoma.  Perform neck stretches daily.   Avoid activities than increase neck pain or stiffness.   Apply heating pad, ice pack, and BioFreeze as needed; alternate every 15-20 minutes.   Massage neck to loosen neck muscles.   Continue tylenol arthritis or muscle relaxer as needed.

## 2023-04-21 NOTE — TELEPHONE ENCOUNTER
Unable to contact pt via phone. Not in service, unable to leave message. Will send pt letter to contact clinic.

## 2023-04-25 NOTE — ED PROVIDER NOTES
Encounter Date: 2/18/2023       History     Chief Complaint   Patient presents with    Facial Swelling     Upper lip swelling that started today. Started on lisinopril on the 12th. Denies throat swelling at this time.      Angioedema after initiating ace inhibitor (lisinopril) right upper lip; no airway involvement, no involvement of tongue      Dental Pain  The primary symptoms include angioedema. Primary symptoms do not include shortness of breath. The symptoms began several hours ago. The symptoms are unchanged. The symptoms are new. The symptoms occur constantly.   The angioedema began 3 - 5 hours ago. The angioedema has been gradually worsening since its onset. It is a new problem. Location: upper lip, right aspect. The angioedema is not associated with shortness of breath or stridor.   Additional symptoms include: facial swelling. Additional symptoms do not include: trismus, trouble swallowing, pain with swallowing, excessive salivation, drooling and ear pain. Medical issues do not include: chewing tobacco and immunosuppression. Associated medical issues comments: recently prescribed ace inhibitor.   Review of patient's allergies indicates:   Allergen Reactions    Lisinopril (bulk) Swelling     No past medical history on file.  No past surgical history on file.  No family history on file.     Review of Systems   HENT:  Positive for facial swelling. Negative for drooling, ear pain and trouble swallowing.    Respiratory:  Negative for shortness of breath and stridor.    All other systems reviewed and are negative.    Physical Exam     Initial Vitals [02/18/23 1711]   BP Pulse Resp Temp SpO2   (!) 194/114 110 18 98.1 °F (36.7 °C) 99 %      MAP       --         Physical Exam    Nursing note and vitals reviewed.  Constitutional: He appears well-developed and well-nourished. He is not diaphoretic. No distress.   HENT:   Head: Normocephalic and atraumatic.   Right Ear: External ear normal.   Left Ear: External ear  normal.   Mouth/Throat: No oropharyngeal exudate.   Angioedema limited to upper lip, right lateral aspect ;   Eyes: EOM are normal. Pupils are equal, round, and reactive to light. Right eye exhibits no discharge. Left eye exhibits no discharge.   Neck: Neck supple. No thyromegaly present. No tracheal deviation present. No JVD present.   Normal range of motion.  Cardiovascular:  Normal rate, regular rhythm, normal heart sounds and intact distal pulses.     Exam reveals no gallop and no friction rub.       No murmur heard.  Pulmonary/Chest: Breath sounds normal. No stridor. No respiratory distress. He has no wheezes. He has no rhonchi. He has no rales.   Abdominal: Abdomen is soft. Bowel sounds are normal. He exhibits no distension. There is no abdominal tenderness. There is no rebound.   Musculoskeletal:         General: No tenderness or edema. Normal range of motion.      Cervical back: Normal range of motion and neck supple.     Neurological: He is alert and oriented to person, place, and time. He has normal strength. He displays normal reflexes. No cranial nerve deficit. GCS score is 15. GCS eye subscore is 4. GCS verbal subscore is 5. GCS motor subscore is 6.   Skin: Skin is warm and dry. Capillary refill takes less than 2 seconds. No rash and no abscess noted. No erythema. No pallor.   Psychiatric: He has a normal mood and affect. His behavior is normal. Judgment and thought content normal.       ED Course   Procedures  Labs Reviewed - No data to display       Imaging Results    None          Medications   EPINEPHrine injection 0.1 mg (0.1 mg Intramuscular Given 2/18/23 1741)   methylPREDNISolone sodium succinate injection 125 mg (125 mg Intravenous Given 2/18/23 1733)   diphenhydrAMINE injection 50 mg (50 mg Intravenous Given 2/18/23 1733)   famotidine (PF) injection 20 mg (20 mg Intravenous Given 2/18/23 1734)     Medical Decision Making:   Initial Assessment:   Angioedema secondary to ace  inhibitor  Differential Diagnosis:   Angioedema, limited; no features suggestive of airway compromise  ED Management:  Improving in the ED with conservative interventions. Plan home with prescriptions, anticipatory guidance (no further treatment with ace inhibitors ), fu instructions, return precautions. Dc in stable condition without event.                         Clinical Impression:   Final diagnoses:  [T78.3XXA] Angioedema, initial encounter (Primary)        ED Disposition Condition    Discharge Stable          ED Prescriptions       Medication Sig Dispense Start Date End Date Auth. Provider    predniSONE (DELTASONE) 50 MG Tab Take 1 tablet (50 mg total) by mouth once daily. for 5 days 5 tablet 2/18/2023 2/23/2023 Richard Johnson MD    diphenhydrAMINE (BENADRYL) 25 mg capsule Take 1 capsule (25 mg total) by mouth every 6 (six) hours as needed for Itching or Allergies. 20 capsule 2/18/2023 -- Richard Johnson MD          Follow-up Information       Follow up With Specialties Details Why Contact Info    [facility] Emergency [facility]  As needed, If symptoms worsen [address] [facility] [address] [phone]             Richard Johnson MD  02/18/23 2029     25-Apr-2023 10:00

## 2023-05-17 ENCOUNTER — HOSPITAL ENCOUNTER (EMERGENCY)
Facility: HOSPITAL | Age: 43
Discharge: HOME | End: 2023-05-17
Attending: FAMILY MEDICINE

## 2023-05-17 VITALS
WEIGHT: 200.63 LBS | SYSTOLIC BLOOD PRESSURE: 146 MMHG | OXYGEN SATURATION: 99 % | TEMPERATURE: 98 F | HEIGHT: 67 IN | DIASTOLIC BLOOD PRESSURE: 96 MMHG | HEART RATE: 90 BPM | RESPIRATION RATE: 18 BRPM | BODY MASS INDEX: 31.49 KG/M2

## 2023-05-17 DIAGNOSIS — I10 PRIMARY HYPERTENSION: Primary | ICD-10-CM

## 2023-05-17 PROCEDURE — 99283 EMERGENCY DEPT VISIT LOW MDM: CPT

## 2023-05-17 PROCEDURE — 25000003 PHARM REV CODE 250: Performed by: PHYSICIAN ASSISTANT

## 2023-05-17 RX ORDER — HYDROXYZINE PAMOATE 25 MG/1
25 CAPSULE ORAL EVERY 8 HOURS PRN
Qty: 16 CAPSULE | Refills: 0 | Status: SHIPPED | OUTPATIENT
Start: 2023-05-17

## 2023-05-17 RX ORDER — HYDROXYZINE PAMOATE 25 MG/1
25 CAPSULE ORAL
Status: COMPLETED | OUTPATIENT
Start: 2023-05-17 | End: 2023-05-17

## 2023-05-17 RX ADMIN — HYDROXYZINE PAMOATE 25 MG: 25 CAPSULE ORAL at 09:05

## 2023-05-18 NOTE — ED PROVIDER NOTES
Encounter Date: 5/17/2023       History     Chief Complaint   Patient presents with    blood pressure evaluation     Pt reports to the ed secondary to blood pressure evaluation. Also states bp at home was 170/109 and currently takes procardia. Bp upon arrival equals 155/93. Rika santa     42-year-old  male with a history of hypertension who presents to the emergency department stating his blood pressure was elevated at home 170/109.  He states he takes nifedipine daily for HTN.  BP on arrival 155/93.  He denies symptoms however he took his blood pressure at home because he has a physical that he has pass tomorrow to get a job and he wanted to check his blood pressure.  He states he believes this is causing anxiety which is causing his blood pressure to intermittently elevate.  He denies vision changes, chest pain, headache, dizziness, nausea, vomiting, syncope, shortness.      The history is provided by the patient. No  was used.   Review of patient's allergies indicates:   Allergen Reactions    Lisinopril (bulk) Other (See Comments)     Angioedema     Past Medical History:   Diagnosis Date    Hypertension     Renal cell carcinoma, right      Past Surgical History:   Procedure Laterality Date    history of partial right nephrectomy  Right 2013    KIDNEY SURGERY Right      No family history on file.  Social History     Tobacco Use    Smoking status: Never    Smokeless tobacco: Never   Substance Use Topics    Alcohol use: Yes     Comment: beer on occ    Drug use: Not Currently     Types: Marijuana     Comment: 2 weeks a month daily     Review of Systems   Constitutional:  Negative for chills and fever.   Eyes: Negative.    Respiratory:  Negative for cough and shortness of breath.    Cardiovascular:  Negative for chest pain, palpitations and leg swelling.   Gastrointestinal:  Negative for abdominal pain, nausea and vomiting.   Genitourinary:  Negative for dysuria and flank pain.    Musculoskeletal:  Negative for back pain, myalgias and neck pain.   Skin:  Negative for rash.   Neurological:  Negative for dizziness, syncope, weakness, light-headedness and headaches.     Physical Exam     Initial Vitals [05/17/23 2039]   BP Pulse Resp Temp SpO2   (!) 155/93 83 18 98.4 °F (36.9 °C) 99 %      MAP       --         Physical Exam    Nursing note and vitals reviewed.  Constitutional: He appears well-developed and well-nourished.   HENT:   Head: Normocephalic and atraumatic.   Nose: Nose normal.   Mouth/Throat: Oropharynx is clear and moist.   Eyes: Conjunctivae and EOM are normal. Pupils are equal, round, and reactive to light.   Neck: Neck supple.   Normal range of motion.  Cardiovascular:  Normal rate, regular rhythm, normal heart sounds and intact distal pulses.           Pulmonary/Chest: Breath sounds normal. He has no wheezes.   Abdominal: Abdomen is soft. Bowel sounds are normal. There is no abdominal tenderness. There is no rebound and no guarding.   Musculoskeletal:         General: Normal range of motion.      Cervical back: Normal range of motion and neck supple.     Lymphadenopathy:     He has no cervical adenopathy.   Neurological: He is alert and oriented to person, place, and time. He has normal strength. GCS score is 15. GCS eye subscore is 4. GCS verbal subscore is 5. GCS motor subscore is 6.   Skin: Skin is warm. Capillary refill takes less than 2 seconds.       ED Course   Procedures  Labs Reviewed - No data to display       Imaging Results    None          Medications   hydrOXYzine pamoate capsule 25 mg (25 mg Oral Given 5/17/23 2132)     Medical Decision Making:   Initial Assessment:   42-year-old  male with a history of hypertension who presents to the emergency department stating his blood pressure was elevated at home 170/109.  Differential Diagnosis:   Hypertension  Anxiety  White coat syndrome  ED Management:  Medication given: Hydroxyzine 25 mg capsule    BP  prior to discharge 146/96    Advised patient to reduce salt intake and to take blood pressure medicine at the same time daily.    He will follow up with the primary care provider  .The patient is resting comfortably and in no acute distress.  He states that his symptoms have improved after treatment in Emergency Department. I personally discussed his test results and treatment plan.  Gave strict ED precautions and specific conditions for return to the emergency department and importance of follow up with pcp.  Patient voices understanding and agrees to the plan discussed. All patients' questions have been answered at this time. He has remained hemodynamically stable throughout entire stay in ED and is stable for discharge home.                         Clinical Impression:   Final diagnoses:  [I10] Primary hypertension (Primary)        ED Disposition Condition    Discharge Stable          ED Prescriptions       Medication Sig Dispense Start Date End Date Auth. Provider    hydrOXYzine pamoate (VISTARIL) 25 MG Cap Take 1 capsule (25 mg total) by mouth every 8 (eight) hours as needed (anxiety). 16 capsule 5/17/2023 -- RADHA Greenfield          Follow-up Information       Follow up With Specialties Details Why Contact Info    [facility] Emergency Medicine  As needed, If symptoms worsen [address] [facility] [id number] [phone]    NICOLASA Okeefe Nurse Practitioner   [address] [facility]  Internal Medicine Clinic  [facility] [address]  [phone]               RADHA Greenfield  05/18/23 3593

## 2023-05-18 NOTE — DISCHARGE INSTRUCTIONS
Limit salt intake.  Take Vistaril as needed for anxiety.  Follow up with the primary care provider within 2-3 days.

## 2023-06-05 PROBLEM — J18.9 CAP (COMMUNITY ACQUIRED PNEUMONIA): Status: RESOLVED | Noted: 2023-03-01 | Resolved: 2023-06-05

## 2023-06-26 PROBLEM — Z00.00 WELLNESS EXAMINATION: Status: RESOLVED | Noted: 2023-03-01 | Resolved: 2023-06-26
